# Patient Record
Sex: FEMALE | URBAN - METROPOLITAN AREA
[De-identification: names, ages, dates, MRNs, and addresses within clinical notes are randomized per-mention and may not be internally consistent; named-entity substitution may affect disease eponyms.]

---

## 2018-07-02 ENCOUNTER — APPOINTMENT (RX ONLY)
Dept: URBAN - METROPOLITAN AREA CLINIC 133 | Facility: CLINIC | Age: 83
Setting detail: DERMATOLOGY
End: 2018-07-02

## 2018-07-02 VITALS — DIASTOLIC BLOOD PRESSURE: 58 MMHG | SYSTOLIC BLOOD PRESSURE: 99 MMHG | HEART RATE: 68 BPM

## 2018-07-02 PROBLEM — Z85.79 PERSONAL HISTORY OF OTHER MALIGNANT NEOPLASMS OF LYMPHOID, HEMATOPOIETIC AND RELATED TISSUES: Status: ACTIVE | Noted: 2018-07-02

## 2018-07-02 PROBLEM — J44.9 CHRONIC OBSTRUCTIVE PULMONARY DISEASE, UNSPECIFIED: Status: ACTIVE | Noted: 2018-07-02

## 2018-07-02 PROBLEM — C44.311 BASAL CELL CARCINOMA OF SKIN OF NOSE: Status: ACTIVE | Noted: 2018-07-02

## 2018-07-02 PROCEDURE — 17311 MOHS 1 STAGE H/N/HF/G: CPT

## 2018-07-02 PROCEDURE — ? MOHS SURGERY

## 2018-07-02 PROCEDURE — 15275 SKIN SUB GRAFT FACE/NK/HF/G: CPT

## 2018-07-02 NOTE — PROCEDURE: MOHS SURGERY
Body Location Override (Optional - Billing Will Still Be Based On Selected Body Map Location If Applicable): RIGHT NASAL SIDEWALL

## 2018-07-06 ENCOUNTER — APPOINTMENT (RX ONLY)
Dept: URBAN - METROPOLITAN AREA CLINIC 132 | Facility: CLINIC | Age: 83
Setting detail: DERMATOLOGY
End: 2018-07-06

## 2018-07-06 DIAGNOSIS — Z48.817 ENCOUNTER FOR SURGICAL AFTERCARE FOLLOWING SURGERY ON THE SKIN AND SUBCUTANEOUS TISSUE: ICD-10-CM

## 2018-07-06 PROCEDURE — ? POST-OP WOUND CHECK

## 2018-07-06 PROCEDURE — 99024 POSTOP FOLLOW-UP VISIT: CPT

## 2018-07-06 ASSESSMENT — LOCATION DETAILED DESCRIPTION DERM: LOCATION DETAILED: RIGHT NASAL DORSUM

## 2018-07-06 ASSESSMENT — LOCATION ZONE DERM: LOCATION ZONE: NOSE

## 2018-07-06 ASSESSMENT — LOCATION SIMPLE DESCRIPTION DERM: LOCATION SIMPLE: NOSE

## 2018-07-06 NOTE — PROCEDURE: POST-OP WOUND CHECK
Wound Evaluated By: Son
Body Location Override (Optional - Billing Will Still Be Based On Selected Body Map Location If Applicable): RIGHT NASAL SIDEwall
Additional Comments: SP. MOHS 7/2/18 right nasal sidewall \\n3 STAGES WITH PRIMATRIX\\nID: 1.0 X 0.8 \\nFD: 1.6 X 1.4 \\nPt still positive/ scheduled another Mohs appointment
Add 86157 Cpt? (Important Note: In 2017 The Use Of 01971 Is Being Tracked By Cms To Determine Future Global Period Reimbursement For Global Periods): yes
Detail Level: Detailed

## 2018-07-09 ENCOUNTER — APPOINTMENT (RX ONLY)
Dept: URBAN - METROPOLITAN AREA CLINIC 133 | Facility: CLINIC | Age: 83
Setting detail: DERMATOLOGY
End: 2018-07-09

## 2018-07-09 VITALS — HEART RATE: 70 BPM | DIASTOLIC BLOOD PRESSURE: 56 MMHG | SYSTOLIC BLOOD PRESSURE: 101 MMHG

## 2018-07-09 PROBLEM — C44.311 BASAL CELL CARCINOMA OF SKIN OF NOSE: Status: ACTIVE | Noted: 2018-07-09

## 2018-07-09 PROCEDURE — 17311 MOHS 1 STAGE H/N/HF/G: CPT

## 2018-07-09 PROCEDURE — ? MOHS SURGERY

## 2018-07-09 NOTE — PROCEDURE: MOHS SURGERY

## 2018-07-09 NOTE — PROCEDURE: MOHS SURGERY
Body Location Override (Optional - Billing Will Still Be Based On Selected Body Map Location If Applicable): lt nasal ala

## 2018-07-30 ENCOUNTER — APPOINTMENT (RX ONLY)
Dept: URBAN - METROPOLITAN AREA CLINIC 133 | Facility: CLINIC | Age: 83
Setting detail: DERMATOLOGY
End: 2018-07-30

## 2018-07-30 DIAGNOSIS — Z48.817 ENCOUNTER FOR SURGICAL AFTERCARE FOLLOWING SURGERY ON THE SKIN AND SUBCUTANEOUS TISSUE: ICD-10-CM

## 2018-07-30 PROBLEM — C44.311 BASAL CELL CARCINOMA OF SKIN OF NOSE: Status: ACTIVE | Noted: 2018-07-30

## 2018-07-30 PROCEDURE — 99024 POSTOP FOLLOW-UP VISIT: CPT

## 2018-07-30 PROCEDURE — ? POST-OP WOUND CHECK

## 2018-07-30 NOTE — PROCEDURE: POST-OP WOUND CHECK
Additional Comments: [email or web address]. Sent in continue wound care. S/P Mohs with  July 9th 2018
Body Location Override (Optional - Billing Will Still Be Based On Selected Body Map Location If Applicable): Rt nasal ala.
Add 10317 Cpt? (Important Note: In 2017 The Use Of 81655 Is Being Tracked By Cms To Determine Future Global Period Reimbursement For Global Periods): yes
Detail Level: Detailed

## 2018-08-02 ENCOUNTER — APPOINTMENT (RX ONLY)
Dept: URBAN - METROPOLITAN AREA CLINIC 133 | Facility: CLINIC | Age: 83
Setting detail: DERMATOLOGY
End: 2018-08-02

## 2018-08-02 DIAGNOSIS — Z85.828 PERSONAL HISTORY OF OTHER MALIGNANT NEOPLASM OF SKIN: ICD-10-CM

## 2018-08-02 DIAGNOSIS — Z48.817 ENCOUNTER FOR SURGICAL AFTERCARE FOLLOWING SURGERY ON THE SKIN AND SUBCUTANEOUS TISSUE: ICD-10-CM

## 2018-08-02 DIAGNOSIS — L57.8 OTHER SKIN CHANGES DUE TO CHRONIC EXPOSURE TO NONIONIZING RADIATION: ICD-10-CM

## 2018-08-02 PROBLEM — D48.5 NEOPLASM OF UNCERTAIN BEHAVIOR OF SKIN: Status: ACTIVE | Noted: 2018-08-02

## 2018-08-02 PROCEDURE — ? COUNSELING

## 2018-08-02 PROCEDURE — 99213 OFFICE O/P EST LOW 20 MIN: CPT | Mod: 25

## 2018-08-02 PROCEDURE — 11100: CPT

## 2018-08-02 PROCEDURE — 11101: CPT

## 2018-08-02 PROCEDURE — ? BIOPSY BY SHAVE METHOD

## 2018-08-02 ASSESSMENT — LOCATION ZONE DERM
LOCATION ZONE: NOSE
LOCATION ZONE: FACE
LOCATION ZONE: ARM

## 2018-08-02 ASSESSMENT — LOCATION SIMPLE DESCRIPTION DERM
LOCATION SIMPLE: RIGHT CHEEK
LOCATION SIMPLE: RIGHT FOREARM
LOCATION SIMPLE: NOSE

## 2018-08-02 ASSESSMENT — LOCATION DETAILED DESCRIPTION DERM
LOCATION DETAILED: RIGHT INFERIOR NASAL CHEEK
LOCATION DETAILED: RIGHT NASAL DORSUM
LOCATION DETAILED: RIGHT DISTAL ULNAR DORSAL FOREARM

## 2018-08-02 NOTE — PROCEDURE: BIOPSY BY SHAVE METHOD
Body Location Override (Optional - Billing Will Still Be Based On Selected Body Map Location If Applicable): left lower eyelid

## 2018-10-31 NOTE — PROCEDURE: MOHS SURGERY
Epidermal Autograft Text: The defect edges were debeveled with a #15 scalpel blade.  Given the location of the defect, shape of the defect and the proximity to free margins an epidermal autograft was deemed most appropriate.  Using a sterile surgical marker, the primary defect shape was transferred to the donor site. The epidermal graft was then harvested.  The skin graft was then placed in the primary defect and oriented appropriately. NT

## 2019-08-20 NOTE — PROCEDURE: MOHS SURGERY
Discharge Summary        Date of Admission:  8/19/2019  Date of Discharge:  8/20/2019        Discharge Diagnosis:      Near syncope  Chest tightness  Elevated troponins likely supply demand mismatch and demand ischemia  Abnormal EKG with atrial fibrillation without acute ischemic changes  Elevated BNP  Chronic atrial fibrillation  Status post recent ablation 1 week prior to admission at [facility]  Acute kidney injury  Chronic kidney disease  Chronic anemia  Report of minor bleeding from the groin site after recent procedure  Diabetes Mellitus, type 2, uncontrolled, with hyperglycemia.   History of mitral and tricuspid valve surgeries in August 2018 at [facility]  Hypertension  Anxiety  Gout          Hospital Course:      74 years old female with history of chronic atrial fibrillation, status post multiple cardioversions and recent ablation, on Coumadin, mitral valve and tricuspid valve surgeries in August 2018 at [facility], hypertension, diabetes, chronic kidney disease, anxiety, chronic anemia, gout.  Patient presented with near syncope and chest tightness    Near syncope  Chest tightness  Elevated troponins likely supply demand mismatch and demand ischemia  Abnormal EKG with atrial fibrillation without acute ischemic changes  Elevated BNP  Chest x-ray with mild pulmonary congestion  Cardiology service on consult  Monitored on telemetry  Monitor cardiac enzymes    Chronic atrial fibrillation  Status post recent ablation 1 week prior to admission at [facility]  Rate controlled  Target INR is 2-3  On Coumadin and Coreg    Acute kidney injury  Chronic kidney disease  Monitor renal functions    Chronic anemia  Report of minor bleeding from the groin site after recent procedure  No current bleeding  Monitor CBC    Diabetes Mellitus, type 2, uncontrolled, with hyperglycemia.   patient was placed on accuchecks  Managed with Insulin sliding scale  adjust insulin accordingly    History of mitral and tricuspid  valve surgeries in August 2018 at [facility]  Cardiology service on consult    Hypertension  Continue home meds    Anxiety  Continue present management    Gout  On allopurinol    DVT prophylaxis  Full code        Patient was discharged in stable condition.  Patient was cleared for discharge by all participating services.     Vitals:   Vital Signs (last 24 hrs)_____ Last Charted___________Minimum____________ Maximum____________  Temp    97.9  (AUG 20 11:09) 97.9  (AUG 20 07:38) 98.2  (AUG 19 20:00)  Heart Rate   76  (AUG 20 11:09) 74  (AUG 20 04:36) 96  (AUG 19 20:15)  Resp Rate       16  (AUG 20 11:09) 16  (AUG 20 07:38) H 25 (AUG 19 22:45)  SBP    135  (AUG 20 13:36) 113  (AUG 20 11:09) H 167 (AUG 19 20:45)  DBP    75  (AUG 20 13:36) 60  (AUG 19 21:45) 82  (AUG 20 00:30)          Physical Exam:    General: alert, oriented. not in acute distress  Psychiatry: cooperative, normal mood and affect.   Neurology: cranial nerves grossly intact.   Eyes: pupils equal reactive to light, extraocular motion is intact. No jaundice. No palor.  HEENT: Neck supple. Oral mucosa is moist.   no Jugular venous distension. no lymphadenopathy.   Lungs: Clear to auscultation bilaterally  Heart: regular rate and rhythm, no murmurs, rubs or gallops  Abdomen: not tender, not distended, positive bowel sounds  Lower limbs: no edema. no joint swelling or tenderness.   A chaperone was present during my entire encounter with the patient today.         Labs:   Labs (Last four charted values)  WBC                  5.3 (AUG 20) 6.1 (AUG 19)   Hgb                  L 8.6 (AUG 20) L 9.3 (AUG 19)   Hct                  L 26 (AUG 20) L 28 (AUG 19)   Plt                  L 138 (AUG 20) 167 (AUG 19)   Na                   141 (AUG 20) 137 (AUG 19)   K                    3.6 (AUG 20) 4.1 (AUG 19)   CO2                  26 (AUG 20) 23 (AUG 19)   Cl                   105 (AUG 20) 102 (AUG 19)   Cr                   H 1.57 (AUG 20) H 1.90 (AUG 19)    BUN                  H 54 (AUG 20) H 55 (AUG 19)   Glucose              H 124 (AUG 20) H 204 (AUG 19)   Mg                   1.9 (AUG 20)   Ca                   8.9 (AUG 20) 9.1 (AUG 19)   PT                   H 19.2 (AUG 20) H 18.7 (AUG 19)   INR                  H 2.0 (AUG 20) H 1.9 (AUG 19)   PTT                  H 34 (AUG 19)   Troponin             C 0.41 (AUG 20) C 0.45 (AUG 19) C 0.49 (AUG 19)          Radiology results: ?          Echocardiogram Results:           Procedure History:    ?      Pending results:           Discharge Medication List:  Tentatively, the patient will be discharged on the following medications. However, please note that this list might change, so please kindly review the printed final discharge medication list handed to the patient.       allopurinol (allopurinol 100 mg oral tablet) 1 tab(s) Oral once a day (in the evening).  ALPRAZolam (ALPRAZolam 0.25 mg oral tablet) 1 tab(s) Oral 3 times a day as needed for anxiety.  ascorbic acid/chondroitin/glucosa/kelly (Glucosamine Chondroitin) 1 cap Oral 2 times a day.  aspirin (aspirin 81 mg EC oral tablet) 1 tab(s) Oral once a day.  calcium-vitamin D (Citracal Regular 250 mg-200 intl units oral tablet) 1 tab(s) Oral once a day (in the evening).  carvedilol (carvedilol 3.125 mg oral tablet) 1 tab(s) Oral 2 times a day.  furosemide (furosemide 40 mg oral tablet) 1 tab(s) Oral once a day (in the morning).  hydrALAZINE (hydrALAZINE 100 mg oral tablet) 1 tab(s) Oral 3 times a day.  losartan (losartan 50 mg oral tablet) 1 tab(s) Oral once a day.  multivitamin with minerals (PreserVision AREDS 2) 1 cap Oral 2 times a day.  omega-3 polyunsaturated fatty acids (Fish Oil 1200 mg oral capsule) 1 cap Oral 2 times a day.  pantoprazole (pantoprazole 40 mg oral EC tablet) 1 tab(s) Oral 2 times a day before meals.  potassium chloride (potassium chloride 20 mEq oral tablet, extended release) 1 tab(s) Oral once a day (in the morning).  sertraline  (sertraline 50 mg oral tablet) 1 tab(s) Oral once a day (in the evening).  warfarin (warfarin 2.5 mg oral tablet) 1 tab(s) Oral Friday.  warfarin (warfarin 5 mg oral tablet) 1 tab(s) Oral Sunday, Monday, Tuesday, Wednesday, Thursday, and Saturday.            Allergies:   Adhesive Bandage; Vicodin; hydrocodone; spironolactone; metoprolol; penicillin; codeine ?  ??  Immunizations will be addressed prior to patient's discharge      Primary Care Physician:   Physician Name: Coty Cardoza DO   Specialty: Internal Medicine / Family Practice    PCP will be notified through a [employer] message.       Consultants:  Jason Cornelius MD       Follow-up instructions:  Discharge to home.       Follow up with PCP. cardiology.                 I spent 40 minutes completing this patient's discharge   Date Of Previous Biopsy (Optional): 6/9/7
